# Patient Record
Sex: FEMALE | Race: WHITE | Employment: OTHER | URBAN - METROPOLITAN AREA
[De-identification: names, ages, dates, MRNs, and addresses within clinical notes are randomized per-mention and may not be internally consistent; named-entity substitution may affect disease eponyms.]

---

## 2021-03-16 ENCOUNTER — HOSPITAL ENCOUNTER (EMERGENCY)
Facility: CLINIC | Age: 66
Discharge: HOME | End: 2021-03-16
Attending: EMERGENCY MEDICINE | Admitting: EMERGENCY MEDICINE
Payer: COMMERCIAL

## 2021-03-16 VITALS
DIASTOLIC BLOOD PRESSURE: 92 MMHG | WEIGHT: 117 LBS | SYSTOLIC BLOOD PRESSURE: 156 MMHG | HEIGHT: 63 IN | TEMPERATURE: 98 F | RESPIRATION RATE: 14 BRPM | BODY MASS INDEX: 20.73 KG/M2 | HEART RATE: 97 BPM | OXYGEN SATURATION: 98 %

## 2021-03-16 DIAGNOSIS — S00.83XA CONTUSION OF FOREHEAD, INITIAL ENCOUNTER: ICD-10-CM

## 2021-03-16 DIAGNOSIS — S01.81XA FACIAL LACERATION, INITIAL ENCOUNTER: ICD-10-CM

## 2021-03-16 PROCEDURE — 12011 RPR F/E/E/N/L/M 2.5 CM/<: CPT

## 2021-03-16 PROCEDURE — 90715 TDAP VACCINE 7 YRS/> IM: CPT | Performed by: EMERGENCY MEDICINE

## 2021-03-16 PROCEDURE — 90471 IMMUNIZATION ADMIN: CPT | Performed by: EMERGENCY MEDICINE

## 2021-03-16 PROCEDURE — 12001 RPR S/N/AX/GEN/TRNK 2.5CM/<: CPT

## 2021-03-16 PROCEDURE — 99283 EMERGENCY DEPT VISIT LOW MDM: CPT | Mod: 25

## 2021-03-16 PROCEDURE — 250N000011 HC RX IP 250 OP 636: Performed by: EMERGENCY MEDICINE

## 2021-03-16 RX ADMIN — CLOSTRIDIUM TETANI TOXOID ANTIGEN (FORMALDEHYDE INACTIVATED), CORYNEBACTERIUM DIPHTHERIAE TOXOID ANTIGEN (FORMALDEHYDE INACTIVATED), BORDETELLA PERTUSSIS TOXOID ANTIGEN (GLUTARALDEHYDE INACTIVATED), BORDETELLA PERTUSSIS FILAMENTOUS HEMAGGLUTININ ANTIGEN (FORMALDEHYDE INACTIVATED), BORDETELLA PERTUSSIS PERTACTIN ANTIGEN, AND BORDETELLA PERTUSSIS FIMBRIAE 2/3 ANTIGEN 0.5 ML: 5; 2; 2.5; 5; 3; 5 INJECTION, SUSPENSION INTRAMUSCULAR at 15:33

## 2021-03-16 ASSESSMENT — ENCOUNTER SYMPTOMS
CONFUSION: 0
NECK PAIN: 0
WOUND: 1
HEADACHES: 0

## 2021-03-16 ASSESSMENT — MIFFLIN-ST. JEOR: SCORE: 1034.84

## 2021-03-16 NOTE — DISCHARGE INSTRUCTIONS
Have stitches removed in 7 days either in clinic or return here.      Follow-up in clinic this week for recheck

## 2021-03-16 NOTE — ED PROVIDER NOTES
"  History   Chief Complaint:  Head Injury       The history is provided by the patient.      Laura Reed is a 65 year old female who presents with a head injury. The patient reports she was walking at [employer] and when she was trying to leave she accidentally ran into the glass door and sustained a laceration to her scalp. She did not lose consciousness. She takes aspirin occasionally, but does not take other blood thinners. She does not use tobacco. She does not have a headache, neck pain, or confusion. Her last Tdap was in .    Review of Systems   Musculoskeletal: Negative for neck pain.   Skin: Positive for wound (head laceration).   Neurological: Negative for headaches.   Psychiatric/Behavioral: Negative for confusion.   All other systems reviewed and are negative.    Allergies:  No Known Drug Allergies     Medications:  Levothyroxine.   Loratadine  Vitamin D    Past Medical History:    Vitamin D deficiency   Hypothyroidism  Hemorrhoids  Osteopenia    Past Surgical History:    Tonsillectomy   section  Breast biopsies     Family History:    Father: Hypertension, heart disease  Brother: Tonsil cancer    Social History:  The patient presents alone.  Tobacco use: negative.    Physical Exam     Patient Vitals for the past 24 hrs:   BP Temp Temp src Pulse Resp SpO2 Height Weight   21 1422 (!) 156/92 98  F (36.7  C) Oral 97 14 98 % 1.6 m (5' 3\") 53.1 kg (117 lb)       Physical Exam  Nursing note and vitals reviewed.  Constitutional:  Appears well-developed and well-nourished.   HENT:   Head:    2.5 cm linear laceration to the lateral aspect of the left eyebrow with mild gape, no bone tenderness.   Mouth/Throat:   Oropharynx is clear and moist. No oropharyngeal exudate.   Eyes:    Pupils are equal, round, and reactive to light.   Neck:    Normal range of motion. Neck supple.      No tracheal deviation present. No thyromegaly present.      Non tender.  Cardiovascular:  Normal rate, regular " rhythm, no murmur   Pulmonary/Chest: Breath sounds are clear and equal without wheezes or crackles.  Abdominal:   Soft. Bowel sounds are normal. Exhibits no distension and      no mass. There is no tenderness.      There is no rebound and no guarding.   Musculoskeletal:  Exhibits no edema.   Lymphadenopathy:  No cervical adenopathy.   Neurological:   Alert and oriented to person, place, and time. GCS 15.  CN 2-12 intact.  and proximal upper extremity strength strong and equal.  Bilateral lower extremity strength strong and equal, including strong dorsiflexion and plantarflexion strength.  Sensation intact and equal to the face, arms and legs.  No facial droop or weakness. Normal speech.  Follows commands and answers questions normally.     Skin:    Skin is warm and dry. No rash noted. No pallor.      Emergency Department Course       Procedures    Laceration Repair        LACERATION:  A simple clean 2.5 cm laceration.      LOCATION:  Lateral aspect of the left eyebrow      ANESTHESIA:  Local using lidocaine 1% with epi      PREPARATION:  Irrigation with Shur Clens      DEBRIDEMENT:  no debridement      CLOSURE:  Wound was closed with One Layer.  Skin closed with 6 x 6.0 Prolene sutures using simple interrupted sutures.       Emergency Department Course:    Reviewed:  I reviewed nursing notes, vitals, and past medical history    Assessments:  1501 I obtained history and examined the patient as noted above.   1534 A laceration repair was performed as outlined in the procedure note above.  The patient tolerated well and there were no complications.     Disposition:  The patient was discharged to home.       Impression & Plan     CMS Diagnoses: None    Medical Decision Making:   Laura Reed is a 65 year old female who presents for evaluation after fall with trauma to the head and facial laceration.  Exam shows a contusion. This patient has a history and clinical exam consistent with contusion to head. Less  likely concussion given scenario but discussed with patient.  The differential diagnosis includes skull fracture, epidural hematoma, subdural hematoma, intracerebral hemorrhage, and traumatic subarachnoid hemorrhage; all of these are highly unlikely in this clinical setting.  By the [facility] CT rules they do not warrant head ct imaging and discussed risk/benefit ratio with patient/family in detail.   The wound was carefully evaluated and explored.  The laceration was closed with sutures as noted above. There is no evidence of muscular, tendon, or bony damage with this laceration.  No signs of foreign body.  Possible complications (infection, scarring) were reviewed with the patient.    Return to ED for red flags (change in behavior, severe headache, drowsiness, seizures, vomiting, etc) and gave concussion precautions for home.  I did stress importance of avoiding a second blow to head just in case she has a concussion.  The patients head to toe trauma exam is otherwise negative for serious underlying disease of the head, neck, chest, abdomen, extremities, pelvis.  No signs of laceration.  I doubt underlying skull fracture.  Follow-up per discharge instructions.     Covid-19  Laura Reed was evaluated during a global COVID-19 pandemic, which necessitated consideration that the patient might be at risk for infection with the SARS-CoV-2 virus that causes COVID-19.   Applicable protocols for evaluation were followed during the patient's care.     Diagnosis:    ICD-10-CM    1. Facial laceration, initial encounter  S01.81XA    2. Contusion of forehead, initial encounter  S00.83XA        Discharge Medications:  New Prescriptions    No medications on file       Scribe Disclosure:  I, Ethel Boggs, am serving as a scribe at 3:00 PM on 3/16/2021 to document services personally performed by Samaria Marie MD based on my observations and the provider's statements to me.          Samaria Marie,  MD  03/16/21 [id number]